# Patient Record
Sex: MALE | Race: OTHER | HISPANIC OR LATINO | URBAN - METROPOLITAN AREA
[De-identification: names, ages, dates, MRNs, and addresses within clinical notes are randomized per-mention and may not be internally consistent; named-entity substitution may affect disease eponyms.]

---

## 2020-07-23 ENCOUNTER — EMERGENCY (EMERGENCY)
Facility: HOSPITAL | Age: 40
LOS: 1 days | Discharge: HOME | End: 2020-07-23
Attending: EMERGENCY MEDICINE
Payer: COMMERCIAL

## 2020-07-23 VITALS
SYSTOLIC BLOOD PRESSURE: 109 MMHG | OXYGEN SATURATION: 98 % | DIASTOLIC BLOOD PRESSURE: 72 MMHG | TEMPERATURE: 98 F | RESPIRATION RATE: 16 BRPM | WEIGHT: 176.37 LBS | HEART RATE: 84 BPM

## 2020-07-23 PROCEDURE — 99283 EMERGENCY DEPT VISIT LOW MDM: CPT

## 2020-07-23 PROCEDURE — 72100 X-RAY EXAM L-S SPINE 2/3 VWS: CPT | Mod: 26

## 2020-07-23 PROCEDURE — 72100 X-RAY EXAM L-S SPINE 2/3 VWS: CPT

## 2020-07-23 RX ORDER — IBUPROFEN 200 MG
1 TABLET ORAL
Qty: 28 | Refills: 0
Start: 2020-07-23 | End: 2020-07-29

## 2020-07-23 RX ORDER — IBUPROFEN 200 MG
600 TABLET ORAL ONCE
Refills: 0 | Status: COMPLETED | OUTPATIENT
Start: 2020-07-23 | End: 2020-07-23

## 2020-07-23 RX ADMIN — Medication 600 MILLIGRAM(S): at 20:45
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
## 2020-07-23 NOTE — ED ADULT NURSE NOTE - NSIMPLEMENTINTERV_GEN_ALL_ED
no
Implemented All Universal Safety Interventions:  [place] to call system. Call bell, personal items and telephone within reach. Instruct patient to call for assistance. Room bathroom lighting operational. Non-slip footwear when patient is off stretcher. Physically safe environment: no spills, clutter or unnecessary equipment. Stretcher in lowest position, wheels locked, appropriate side rails in place.
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
## 2020-07-23 NOTE — ED PROVIDER NOTE - PATIENT PORTAL LINK FT
You can access the FollowMyHealth Patient Portal offered by [facility] by registering at the following website: [email or web address]. By joining [employer]’s FollowMyHealth portal, you will also be able to view your health information using other applications (apps) compatible with our system.

## 2023-12-28 ENCOUNTER — EMERGENCY (EMERGENCY)
Facility: HOSPITAL | Age: 43
LOS: 1 days | Discharge: HOME | End: 2023-12-28
Attending: STUDENT IN AN ORGANIZED HEALTH CARE EDUCATION/TRAINING PROGRAM
Payer: COMMERCIAL

## 2023-12-28 VITALS
SYSTOLIC BLOOD PRESSURE: 113 MMHG | WEIGHT: 175.93 LBS | HEIGHT: 66 IN | OXYGEN SATURATION: 96 % | TEMPERATURE: 98 F | HEART RATE: 77 BPM | RESPIRATION RATE: 18 BRPM | DIASTOLIC BLOOD PRESSURE: 76 MMHG

## 2023-12-28 VITALS
HEART RATE: 66 BPM | DIASTOLIC BLOOD PRESSURE: 66 MMHG | OXYGEN SATURATION: 100 % | TEMPERATURE: 98 F | RESPIRATION RATE: 18 BRPM | SYSTOLIC BLOOD PRESSURE: 105 MMHG

## 2023-12-28 LAB
ALBUMIN SERPL ELPH-MCNC: 3.9 G/DL — SIGNIFICANT CHANGE UP (ref 3.5–5)
ALBUMIN SERPL ELPH-MCNC: 3.9 G/DL — SIGNIFICANT CHANGE UP (ref 3.5–5)
ALP SERPL-CCNC: 48 U/L — SIGNIFICANT CHANGE UP (ref 40–120)
ALP SERPL-CCNC: 48 U/L — SIGNIFICANT CHANGE UP (ref 40–120)
ALT FLD-CCNC: 26 U/L DA — SIGNIFICANT CHANGE UP (ref 10–60)
ALT FLD-CCNC: 26 U/L DA — SIGNIFICANT CHANGE UP (ref 10–60)
ANION GAP SERPL CALC-SCNC: 5 MMOL/L — SIGNIFICANT CHANGE UP (ref 5–17)
ANION GAP SERPL CALC-SCNC: 5 MMOL/L — SIGNIFICANT CHANGE UP (ref 5–17)
AST SERPL-CCNC: 16 U/L — SIGNIFICANT CHANGE UP (ref 10–40)
AST SERPL-CCNC: 16 U/L — SIGNIFICANT CHANGE UP (ref 10–40)
BASOPHILS # BLD AUTO: 0.05 K/UL — SIGNIFICANT CHANGE UP (ref 0–0.2)
BASOPHILS # BLD AUTO: 0.05 K/UL — SIGNIFICANT CHANGE UP (ref 0–0.2)
BASOPHILS NFR BLD AUTO: 0.8 % — SIGNIFICANT CHANGE UP (ref 0–2)
BASOPHILS NFR BLD AUTO: 0.8 % — SIGNIFICANT CHANGE UP (ref 0–2)
BILIRUB SERPL-MCNC: 1.4 MG/DL — HIGH (ref 0.2–1.2)
BILIRUB SERPL-MCNC: 1.4 MG/DL — HIGH (ref 0.2–1.2)
BUN SERPL-MCNC: 12 MG/DL — SIGNIFICANT CHANGE UP (ref 7–18)
BUN SERPL-MCNC: 12 MG/DL — SIGNIFICANT CHANGE UP (ref 7–18)
CALCIUM SERPL-MCNC: 9.2 MG/DL — SIGNIFICANT CHANGE UP (ref 8.4–10.5)
CALCIUM SERPL-MCNC: 9.2 MG/DL — SIGNIFICANT CHANGE UP (ref 8.4–10.5)
CHLORIDE SERPL-SCNC: 104 MMOL/L — SIGNIFICANT CHANGE UP (ref 96–108)
CHLORIDE SERPL-SCNC: 104 MMOL/L — SIGNIFICANT CHANGE UP (ref 96–108)
CO2 SERPL-SCNC: 29 MMOL/L — SIGNIFICANT CHANGE UP (ref 22–31)
CO2 SERPL-SCNC: 29 MMOL/L — SIGNIFICANT CHANGE UP (ref 22–31)
CREAT SERPL-MCNC: 0.81 MG/DL — SIGNIFICANT CHANGE UP (ref 0.5–1.3)
CREAT SERPL-MCNC: 0.81 MG/DL — SIGNIFICANT CHANGE UP (ref 0.5–1.3)
EGFR: 112 ML/MIN/1.73M2 — SIGNIFICANT CHANGE UP
EGFR: 112 ML/MIN/1.73M2 — SIGNIFICANT CHANGE UP
EOSINOPHIL # BLD AUTO: 0.08 K/UL — SIGNIFICANT CHANGE UP (ref 0–0.5)
EOSINOPHIL # BLD AUTO: 0.08 K/UL — SIGNIFICANT CHANGE UP (ref 0–0.5)
EOSINOPHIL NFR BLD AUTO: 1.3 % — SIGNIFICANT CHANGE UP (ref 0–6)
EOSINOPHIL NFR BLD AUTO: 1.3 % — SIGNIFICANT CHANGE UP (ref 0–6)
FLUAV AG NPH QL: SIGNIFICANT CHANGE UP
FLUAV AG NPH QL: SIGNIFICANT CHANGE UP
FLUBV AG NPH QL: SIGNIFICANT CHANGE UP
FLUBV AG NPH QL: SIGNIFICANT CHANGE UP
GLUCOSE SERPL-MCNC: 81 MG/DL — SIGNIFICANT CHANGE UP (ref 70–99)
GLUCOSE SERPL-MCNC: 81 MG/DL — SIGNIFICANT CHANGE UP (ref 70–99)
HCT VFR BLD CALC: 46.1 % — SIGNIFICANT CHANGE UP (ref 39–50)
HCT VFR BLD CALC: 46.1 % — SIGNIFICANT CHANGE UP (ref 39–50)
HGB BLD-MCNC: 14.7 G/DL — SIGNIFICANT CHANGE UP (ref 13–17)
HGB BLD-MCNC: 14.7 G/DL — SIGNIFICANT CHANGE UP (ref 13–17)
IMM GRANULOCYTES NFR BLD AUTO: 0.2 % — SIGNIFICANT CHANGE UP (ref 0–0.9)
IMM GRANULOCYTES NFR BLD AUTO: 0.2 % — SIGNIFICANT CHANGE UP (ref 0–0.9)
LYMPHOCYTES # BLD AUTO: 1.61 K/UL — SIGNIFICANT CHANGE UP (ref 1–3.3)
LYMPHOCYTES # BLD AUTO: 1.61 K/UL — SIGNIFICANT CHANGE UP (ref 1–3.3)
LYMPHOCYTES # BLD AUTO: 25.4 % — SIGNIFICANT CHANGE UP (ref 13–44)
LYMPHOCYTES # BLD AUTO: 25.4 % — SIGNIFICANT CHANGE UP (ref 13–44)
MCHC RBC-ENTMCNC: 27.4 PG — SIGNIFICANT CHANGE UP (ref 27–34)
MCHC RBC-ENTMCNC: 27.4 PG — SIGNIFICANT CHANGE UP (ref 27–34)
MCHC RBC-ENTMCNC: 31.9 GM/DL — LOW (ref 32–36)
MCHC RBC-ENTMCNC: 31.9 GM/DL — LOW (ref 32–36)
MCV RBC AUTO: 86 FL — SIGNIFICANT CHANGE UP (ref 80–100)
MCV RBC AUTO: 86 FL — SIGNIFICANT CHANGE UP (ref 80–100)
MONOCYTES # BLD AUTO: 0.48 K/UL — SIGNIFICANT CHANGE UP (ref 0–0.9)
MONOCYTES # BLD AUTO: 0.48 K/UL — SIGNIFICANT CHANGE UP (ref 0–0.9)
MONOCYTES NFR BLD AUTO: 7.6 % — SIGNIFICANT CHANGE UP (ref 2–14)
MONOCYTES NFR BLD AUTO: 7.6 % — SIGNIFICANT CHANGE UP (ref 2–14)
NEUTROPHILS # BLD AUTO: 4.12 K/UL — SIGNIFICANT CHANGE UP (ref 1.8–7.4)
NEUTROPHILS # BLD AUTO: 4.12 K/UL — SIGNIFICANT CHANGE UP (ref 1.8–7.4)
NEUTROPHILS NFR BLD AUTO: 64.7 % — SIGNIFICANT CHANGE UP (ref 43–77)
NEUTROPHILS NFR BLD AUTO: 64.7 % — SIGNIFICANT CHANGE UP (ref 43–77)
NRBC # BLD: 0 /100 WBCS — SIGNIFICANT CHANGE UP (ref 0–0)
NRBC # BLD: 0 /100 WBCS — SIGNIFICANT CHANGE UP (ref 0–0)
PLATELET # BLD AUTO: 266 K/UL — SIGNIFICANT CHANGE UP (ref 150–400)
PLATELET # BLD AUTO: 266 K/UL — SIGNIFICANT CHANGE UP (ref 150–400)
POTASSIUM SERPL-MCNC: 3.5 MMOL/L — SIGNIFICANT CHANGE UP (ref 3.5–5.3)
POTASSIUM SERPL-MCNC: 3.5 MMOL/L — SIGNIFICANT CHANGE UP (ref 3.5–5.3)
POTASSIUM SERPL-SCNC: 3.5 MMOL/L — SIGNIFICANT CHANGE UP (ref 3.5–5.3)
POTASSIUM SERPL-SCNC: 3.5 MMOL/L — SIGNIFICANT CHANGE UP (ref 3.5–5.3)
PROT SERPL-MCNC: 7.6 G/DL — SIGNIFICANT CHANGE UP (ref 6–8.3)
PROT SERPL-MCNC: 7.6 G/DL — SIGNIFICANT CHANGE UP (ref 6–8.3)
RBC # BLD: 5.36 M/UL — SIGNIFICANT CHANGE UP (ref 4.2–5.8)
RBC # BLD: 5.36 M/UL — SIGNIFICANT CHANGE UP (ref 4.2–5.8)
RBC # FLD: 14 % — SIGNIFICANT CHANGE UP (ref 10.3–14.5)
RBC # FLD: 14 % — SIGNIFICANT CHANGE UP (ref 10.3–14.5)
SARS-COV-2 RNA SPEC QL NAA+PROBE: SIGNIFICANT CHANGE UP
SARS-COV-2 RNA SPEC QL NAA+PROBE: SIGNIFICANT CHANGE UP
SODIUM SERPL-SCNC: 138 MMOL/L — SIGNIFICANT CHANGE UP (ref 135–145)
SODIUM SERPL-SCNC: 138 MMOL/L — SIGNIFICANT CHANGE UP (ref 135–145)
WBC # BLD: 6.35 K/UL — SIGNIFICANT CHANGE UP (ref 3.8–10.5)
WBC # BLD: 6.35 K/UL — SIGNIFICANT CHANGE UP (ref 3.8–10.5)
WBC # FLD AUTO: 6.35 K/UL — SIGNIFICANT CHANGE UP (ref 3.8–10.5)
WBC # FLD AUTO: 6.35 K/UL — SIGNIFICANT CHANGE UP (ref 3.8–10.5)

## 2023-12-28 PROCEDURE — 80053 COMPREHEN METABOLIC PANEL: CPT

## 2023-12-28 PROCEDURE — 96374 THER/PROPH/DIAG INJ IV PUSH: CPT

## 2023-12-28 PROCEDURE — 36415 COLL VENOUS BLD VENIPUNCTURE: CPT

## 2023-12-28 PROCEDURE — 99284 EMERGENCY DEPT VISIT MOD MDM: CPT

## 2023-12-28 PROCEDURE — 99284 EMERGENCY DEPT VISIT MOD MDM: CPT | Mod: 25

## 2023-12-28 PROCEDURE — 96375 TX/PRO/DX INJ NEW DRUG ADDON: CPT

## 2023-12-28 PROCEDURE — 85025 COMPLETE CBC W/AUTO DIFF WBC: CPT

## 2023-12-28 PROCEDURE — 87637 SARSCOV2&INF A&B&RSV AMP PRB: CPT

## 2023-12-28 PROCEDURE — 93005 ELECTROCARDIOGRAM TRACING: CPT

## 2023-12-28 RX ORDER — ACETAMINOPHEN 500 MG
1000 TABLET ORAL ONCE
Refills: 0 | Status: COMPLETED | OUTPATIENT
Start: 2023-12-28 | End: 2023-12-28

## 2023-12-28 RX ORDER — KETOROLAC TROMETHAMINE 30 MG/ML
15 SYRINGE (ML) INJECTION ONCE
Refills: 0 | Status: DISCONTINUED | OUTPATIENT
Start: 2023-12-28 | End: 2023-12-28

## 2023-12-28 RX ORDER — SODIUM CHLORIDE 9 MG/ML
1000 INJECTION INTRAMUSCULAR; INTRAVENOUS; SUBCUTANEOUS ONCE
Refills: 0 | Status: COMPLETED | OUTPATIENT
Start: 2023-12-28 | End: 2023-12-28

## 2023-12-28 RX ADMIN — SODIUM CHLORIDE 1000 MILLILITER(S): 9 INJECTION INTRAMUSCULAR; INTRAVENOUS; SUBCUTANEOUS at 14:44

## 2023-12-28 RX ADMIN — Medication 15 MILLIGRAM(S): at 14:44

## 2023-12-28 RX ADMIN — Medication 400 MILLIGRAM(S): at 14:44

## 2023-12-28 NOTE — ED PROVIDER NOTE - NSFOLLOWUPINSTRUCTIONS_ED_ALL_ED_FT
Le atendieron hoy en urgencias por dolor de alex y diarrea.    Mientras estuvo aquí, realizó laboratorios que se discutieron con usted.  No encontramos ninguna condición quirúrgica o de emergencia que requiera que usted permanezca en el hospital.    Kelsi un seguimiento con powell médico de atención primaria dentro de 1 a 3 días. Llame y hágales saber que lo atendieron en emergencias hoy.  Lleve los resultados de hi análisis de zina e imágenes a powell tim, si corresponde.    Para el dolor, tome acetaminofén 650 mg cada 6 horas.  Además, también puedes sandee ibuprofeno 600 mg cada 6 horas para el dolor.  Mantente hidratado, bouchra mucha agua. Consuma comidas regulares.    Regrese a la mike de emergencias si cualquier síntoma o inquietud empeora, incluido dolor en el pecho, dificultad para respirar, aturdimiento, debilidad o cualquier otra inquietud.    ----    You were seen in the ER today for headache and diarrhea.    While you were here you labs done which were discussed with you.   We did not find any emergent or surgical conditions that require you to stay in the hospital.    Please follow up with your primary care doctor within 1 - 3 days. Call and let them know you were seen in the ER today.   Bring the results of your blood work and imaging with you to your appointment, if applicable.    For pain, please take acetaminophen 650 mg every 6 hours for pain.   Additionally, you can also take ibuprofen 600 mg every 6 hours for pain.  Stay hydrated, drink plenty of water. Eat regular meals.    Return to the ER for any worsening symptoms or concerns, including chest pain, shortness of breath, lightheadedness, weakness, or any other concerns.

## 2023-12-28 NOTE — ED ADULT NURSE NOTE - NS_SISCREENINGSR_GEN_ALL_ED
Negative Home Suture Removal Text: Patient was provided a home suture removal kit and will remove their sutures at home.  If they have any questions or difficulties they will call the office.

## 2023-12-28 NOTE — ED ADULT TRIAGE NOTE - CHIEF COMPLAINT QUOTE
Pt c/o dizziness, headaches, with diarrhea x 2 days. Pt states "under my eyes feel heavy and burning".

## 2023-12-28 NOTE — ED PROVIDER NOTE - PROGRESS NOTE DETAILS
Angela-: pt seen and re-evaluated at bedside.  Pt states their symptoms have improved.  Pt comfortable in NAD.  Discussed lab results using  #[id number], discussed supportive treatment, PMD follow up, and return precautions, pt understood and agreeable with plan. Angela-: pt seen and re-evaluated at bedside.  Pt states their symptoms have improved.  Pt comfortable in NAD.  Discussed lab results using  #[id number], discussed supportive treatment, PMD follow up, and return precautions, pt understood and agreeable with plan.

## 2023-12-28 NOTE — ED PROVIDER NOTE - ATTENDING CONTRIBUTION TO CARE
43 year old male with no pertinent PMH presents with diarrhea x 2 days. Patient reports subjective fevers, chills, ~3 episodes of loose stools daily associated with nausea. Denies any documented fevers, chest pain, shortness of breath, bloody stools, black tarry stools, or rash. Denies any recent hospitalization, recent abx use, or recent travel. Denies any additional complaints. Abdomen nontender. Neurologic exam is nonfocal, not c/w CVA or primary neurologic abnormality. Will obtain labs eval for infectious/metabolic abnormality, provide supportive treatment with dispo pending workup.

## 2023-12-28 NOTE — ED PROVIDER NOTE - PATIENT PORTAL LINK FT
You can access the FollowMyHealth Patient Portal offered by [facility] by registering at the following website: [email or web address]. By joining [employer]’s FollowMyHealth portal, you will also be able to view your health information using other applications (apps) compatible with our system. You can access the FollowMyHealth Patient Portal offered by [facility] by registering at the following website: [email or web address]. By joining [employer]’s FollowMyHealth portal, you will also be able to view your health information using other applications (apps) compatible with our system.

## 2023-12-28 NOTE — ED ADULT NURSE NOTE - NSFALLHARMRISKINTERV_ED_ALL_ED
Communicate risk of Fall with Harm to all staff, patient, and family/Provide visual cue: red socks, yellow wristband, yellow gown, etc/Reinforce activity limits and safety measures with patient and family/Bed in lowest position, wheels locked, appropriate side rails in place/Call bell, personal items and telephone in reach/Instruct patient to call for assistance before getting out of bed/chair/stretcher/Non-slip footwear applied when patient is off stretcher/[place] to call system/Physically safe environment - no spills, clutter or unnecessary equipment/Purposeful Proactive Rounding/Room/bathroom lighting operational, light cord in reach Communicate risk of Fall with Harm to all staff, patient, and family/Provide visual cue: red socks, yellow wristband, yellow gown, etc/Reinforce activity limits and safety measures with patient and family/Bed in lowest position, wheels locked, appropriate side rails in place/Call bell, personal items and telephone in reach/Instruct patient to call for assistance before getting out of bed/chair/stretcher/Non-slip footwear applied when patient is off stretcher/[place] to call system/Physically safe environment - no spills, clutter or unnecessary equipment/Purposeful Proactive Rounding/Room/bathroom lighting operational, light cord in reach

## 2023-12-28 NOTE — ED ADULT NURSE NOTE - OBJECTIVE STATEMENT
pt reports that he started having diarrhea, abdominal pain, dizziness and weakness for the past 2 days. Pt reports only today did he have a bm with a small amount of blood. Pt denies nausea and vomiting and fever. pt reports that he started having diarrhea, abdominal pain, dizziness and weakness for the past 2 days. Pt reports only today did he have a bm with a small amount of blood. Pt denies nausea and vomiting and fever.  ID # [id number]  Name: Matias pt reports that he started having diarrhea, abdominal pain, dizziness and weakness for the past 2 days. Pt reports only today did he have a bm with a small amount of blood. Pt denies nausea and vomiting and fever.  ID # [id number]  Name: Matias

## 2024-03-07 NOTE — ED PROVIDER NOTE - CLINICAL SUMMARY MEDICAL DECISION MAKING FREE TEXT BOX
Martha Parks DO PGY-3  HPI:   43-year-old male with no past medical history not on any medications presents to the ED with 2 days of subjective fever associated with 3 episodes of loose stools daily nonbloody, associated nausea but no vomiting.  Also has gradual onset frontal headache with bilateral eye pressure no vision changes, and lightheadedness with standing up.  Patient reports that he has not been eating and drinking well since symptoms started.  Denies recent travel, hospitalizations, antibiotic use, cough, congestion, fevers or chills.  Has some diffuse cramping abdominal pain that is intermittent.    ROS:   Denies fever, chills, chest pain, shortness of breath, vomiting, dysuria, hematuria    PHYSICAL EXAM:  CONSTITUTIONAL: Well appearing, awake, alert, oriented to person, place, time/situation and in no apparent distress.  HEAD: Atraumatic, no step offs.  NECK: Supple, no meningismus.   EYES: Clear bilaterally, pupils equal, round and reactive to light.  ENMT: Airway patent, Nasal mucosa clear. Mouth with normal mucosa. Uvula is midline.   CARDIAC: Normal rate, regular rhythm. +S1/S2. No murmurs, rubs or gallops.  RESPIRATORY: Breathing unlabored. Breath sounds clear and equal bilaterally.  ABDOMEN:  Soft, nontender, nondistended. No rebound tenderness or guarding.  FLANK: No CVA tenderness B/L.  NEUROLOGICAL: Alert and oriented, no focal deficits, no motor or sensory deficits. CN2-12 intact. Sensation intact x4 extremities. Strength equal of upper and lower extremities B/L.   MSK: No clubbing, cyanosis, or edema. Full range of motion of all extremities. No tenderness to palpation. No midline or paraspinal tenderness. No spinal step-offs.  SKIN: Skin warm and dry. No evidence of rashes or lesions.    MDM:   43-year-old male with no past medical history presents to the ED with 2 days of diarrhea, lightheadedness, nausea and headache.  Arrived hemodynamically stable, nontoxic-appearing, no focal neurodeficits on exam and no abdominal tenderness to palpation without signs of peritoneal signs.  Differential includes but is not limited to viral syndrome, hematologic or electrolyte abnormalities, dehydration, tension headache versus migraine.  Plan to obtain labs, swab, give IV fluid hydration, treat headache and reassess.. Unknown if ever smoked

## 2024-06-21 NOTE — ED PROVIDER NOTE - TOBACCO USE
Genitourinary:  Negative for difficulty urinating, dysuria, flank pain, frequency, hematuria, menstrual problem, vaginal bleeding, vaginal discharge and vaginal pain.   Musculoskeletal:  Positive for arthralgias (right knee and hands at times). Negative for back pain, joint swelling, neck pain and neck stiffness.   Skin:  Negative for pallor, rash and wound.   Allergic/Immunologic: Positive for environmental allergies. Negative for food allergies.   Neurological:  Positive for headaches (occasional migraine). Negative for dizziness, tremors, seizures, weakness and numbness.   Hematological:  Negative for adenopathy. Bruises/bleeds easily.   Psychiatric/Behavioral:  Negative for behavioral problems, decreased concentration, dysphoric mood and sleep disturbance. The patient is not nervous/anxious.           Objective   Physical Exam  Vitals and nursing note reviewed. Exam conducted with a chaperone present.   Constitutional:       Appearance: Normal appearance. She is normal weight.   HENT:      Head: Normocephalic.      Right Ear: Tympanic membrane, ear canal and external ear normal.      Left Ear: Tympanic membrane, ear canal and external ear normal.      Nose: Nose normal.      Mouth/Throat:      Mouth: Mucous membranes are moist.   Eyes:      Extraocular Movements: Extraocular movements intact.      Conjunctiva/sclera: Conjunctivae normal.      Pupils: Pupils are equal, round, and reactive to light.   Cardiovascular:      Rate and Rhythm: Normal rate and regular rhythm.      Pulses: Normal pulses.      Heart sounds: Normal heart sounds.   Pulmonary:      Effort: Pulmonary effort is normal.      Breath sounds: Normal breath sounds.   Chest:   Breasts:     Right: Normal.      Left: Normal.   Abdominal:      General: Abdomen is flat. Bowel sounds are normal.   Genitourinary:     Exam position: Supine.          Comments: Area of firmness in left labia No fluctuance at present Tender to palpation. No 
Never smoker

## 2025-01-24 ENCOUNTER — EMERGENCY (EMERGENCY)
Facility: HOSPITAL | Age: 45
LOS: 1 days | Discharge: HOME | End: 2025-01-24
Attending: EMERGENCY MEDICINE
Payer: COMMERCIAL

## 2025-01-24 VITALS
OXYGEN SATURATION: 100 % | WEIGHT: 175.05 LBS | HEART RATE: 87 BPM | DIASTOLIC BLOOD PRESSURE: 73 MMHG | HEIGHT: 67 IN | RESPIRATION RATE: 18 BRPM | TEMPERATURE: 99 F | SYSTOLIC BLOOD PRESSURE: 113 MMHG

## 2025-01-24 PROBLEM — Z78.9 OTHER SPECIFIED HEALTH STATUS: Chronic | Status: ACTIVE | Noted: 2023-12-28

## 2025-01-24 LAB
FLUAV AG NPH QL: SIGNIFICANT CHANGE UP
FLUBV AG NPH QL: SIGNIFICANT CHANGE UP
RSV RNA NPH QL NAA+NON-PROBE: SIGNIFICANT CHANGE UP
SARS-COV-2 RNA SPEC QL NAA+PROBE: DETECTED

## 2025-01-24 PROCEDURE — 99283 EMERGENCY DEPT VISIT LOW MDM: CPT | Mod: 25

## 2025-01-24 PROCEDURE — 99284 EMERGENCY DEPT VISIT MOD MDM: CPT

## 2025-01-24 PROCEDURE — 71046 X-RAY EXAM CHEST 2 VIEWS: CPT

## 2025-01-24 PROCEDURE — 87637 SARSCOV2&INF A&B&RSV AMP PRB: CPT

## 2025-01-24 PROCEDURE — 71046 X-RAY EXAM CHEST 2 VIEWS: CPT | Mod: 26

## 2025-01-24 RX ORDER — IBUPROFEN 200 MG
400 TABLET ORAL ONCE
Refills: 0 | Status: COMPLETED | OUTPATIENT
Start: 2025-01-24 | End: 2025-01-24

## 2025-01-24 RX ORDER — CYCLOBENZAPRINE HCL 10 MG
5 TABLET ORAL ONCE
Refills: 0 | Status: COMPLETED | OUTPATIENT
Start: 2025-01-24 | End: 2025-01-24

## 2025-01-24 RX ADMIN — Medication 400 MILLIGRAM(S): at 12:16

## 2025-01-24 RX ADMIN — Medication 5 MILLIGRAM(S): at 12:16

## 2025-01-24 RX ADMIN — Medication 400 MILLIGRAM(S): at 12:46

## 2025-01-24 NOTE — ED PROVIDER NOTE - OBJECTIVE STATEMENT
44 year-old man with no PMHx presenting with L sided back pain since yesterday morning. Reports the pain is 8/10 severity and radiates around his side towards his LUQ. The pain is exacerbated by movement and deep breath. He has not taken any medication for his symptoms. Also endorses associated headache, sore throat, nasal congestion, and dry cough. Denies history of trauma/falls, fevers/chill, chest pain, SOB, leg pain or swelling.     PMHx/PSHx: none  Meds: none  NKDA  Social: no nicotine use

## 2025-01-24 NOTE — ED PROVIDER NOTE - NSFOLLOWUPINSTRUCTIONS_ED_ALL_ED_FT
COVID-19 es pippa infección causada por un virus llamado SARS-CoV-2.     ¿Cuáles son las causas?  El COVID-19 es causado por un virus. Yessi virus puede estar en el aire en forma de gotitas o en superficies. Puede transmitirse de pippa persona infectada cuando tose, estornuda, habla, canta o respira.     Usted corre el riesgo de contraer COVID-19 si ha estado cerca de alguien con la infección. Es más probable que usted se enferme gravemente si:  Tienes 65 años o más.  Tiene ciertas condiciones médicas, lennie:  Cardiopatía.  Diabetes.  Enfermedad respiratoria crónica.  Cáncer.  Embarazo.  Estás inmunocomprometido. Industry significa que powell cuerpo no puede combatir las infecciones fácilmente.  Tienes pippa discapacidad o problemas para moverte, lo que significa que estás inmóvil.    ¿Cuáles son los signos o síntomas?  Las personas pueden tener síntomas diferentes a los del COVID-19. Los síntomas también pueden ser de leves a graves. A menudo aparecen entre 5 y 6 días después de la infección. Genoveva pueden tardar hasta 14 días en aparecer. Los síntomas comunes son:  Tos.  Sentirse cansado.  Nueva pérdida del gusto o del olfato.  Fiebre.  Los síntomas menos comunes son:  Dolor de garganta.  Dolor de alex.  Tanya corporales o musculares.  Diarrea.  Pippa erupción cutánea o dedos de mackenzie o pies de colores extraños.  Ojos rojos o irritados.  A veces, el COVID-19 no causa síntomas.    ¿Cómo se diagnostica esto?  El COVID-19 se puede diagnosticar con pruebas realizadas en el laboratorio o en casa. Se utilizará líquido de la nariz, la boca o los pulmones para detectar el virus.    ¿Cómo se trata esto?  El tratamiento para el COVID-19 depende de qué tan enfermo esté.  Los síntomas leves se pueden tratar en casa con reposo, líquidos y medicamentos de venta kolton.  Los síntomas graves pueden tratarse en pippa unidad de cuidados intensivos (UCI) de un hospital.  Si tiene síntomas y corre riesgo de enfermarse gravemente, es posible que le administren un medicamento que combate los virus. Yessi medicamento se llama antiviral.    ¿Cómo se previene esto?  Para protegerse del COVID-19:  Conozca elsa factores de riesgo.  Vacúnate.  Si powell cuerpo no puede combatir las infecciones fácilmente, hable con powell proveedor sobre el tratamiento para ayudar a prevenir el COVID-19.  Manténgase al menos a 1 metro de distancia de los demás.  Use pippa mascarilla kuldeep ajustada cuando:  No puedes mantenerte alejado de la gente.  Estás en un lugar con poca circulación de aire.  Trate de estar en espacios abiertos con buena circulación de aire cuando esté en público.  Lávese las mackenzie con frecuencia o use un desinfectante para mackenzie a base de alcohol.  Cúbrete la nariz y la boca al toser y estornudar.  Si dion que tiene COVID-19 o ha estado cerca de alguien que lo tiene, quédese en casa y esté solo charissa 5 a 10 días.    Dónde encontrar más información  Centros para el Control y la Prevención de Enfermedades (CDC): cdc.gov  [employer] la [place] (OMS): who.int    Obtenga ayuda de inmediato si:  Tiene dificultad para respirar o le falta el aire.  Tiene dolor o presión en el pecho.  No puedes hablar ni  ninguna parte de tu cuerpo.  Estás confundido.  Elsa síntomas empeoran.  Estos síntomas pueden ser pippa emergencia. Obtenga ayuda de inmediato. Llame al 911.  No espere a crispin si los síntomas desaparecen.  No conduzca usted mismo hasta el hospital.  Esta información no pretende reemplazar los consejos que le brinde powell proveedor de atención médica. Asegúrese de discutir cualquier pregunta que tenga con powell proveedor de atención médica.

## 2025-01-24 NOTE — ED PROVIDER NOTE - PATIENT PORTAL LINK FT
You can access the FollowMyHealth Patient Portal offered by [facility] by registering at the following website: [email or web address]. By joining [employer]’s FollowMyHealth portal, you will also be able to view your health information using other applications (apps) compatible with our system.

## 2025-01-24 NOTE — ED PROVIDER NOTE - ATTENDING CONTRIBUTION TO CARE
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  No smoking  Physical exam chest is clear heart regular rhythm abdomen is soft  Plan will get a chest x-ray will get a Motrin and Flexeril by PERC criteria patient rule out for pulmonary embolism    Agree with medical student's assessment history and physical and disposition

## 2025-01-24 NOTE — ED PROVIDER NOTE - CLINICAL SUMMARY MEDICAL DECISION MAKING FREE TEXT BOX
Healthy 43yo man presenting with back pain and URI sx, likely MSK etiology. Pneumonia, stone not consistent with physical exam. Low risk for PE by PERC criteria. Flexeril and motrin for pain. Will order flu/covid swab and CXR to evaluate for consolidation and fracture.

## 2025-01-24 NOTE — ED PROVIDER NOTE - PHYSICAL EXAMINATION
GENERAL: NAD, sitting comfortably  HEAD:  Atraumatic, normocephalic  EYES: Conjunctiva and sclera clear  NECK: Supple, trachea midline, no JVD  HEART: Regular rate and rhythm, no murmurs, rubs, or gallops  LUNGS: Visibly pained with deep inhalation; clear to auscultation bilaterally, no crackles, wheezing, or rhonchi  ABDOMEN: Soft, nontender, nondistended, +BS  EXTREMITIES: 2+ peripheral pulses bilaterally. No edema. No LE tenderness  NERVOUS SYSTEM:  A&Ox3, moving all extremities, no focal deficits   MSK: Pain L sided back pain provoked by leftward rotation of torso, not exacerbated by arm movements   SKIN: No rashes or lesions

## 2025-01-24 NOTE — ED ADULT TRIAGE NOTE - CHIEF COMPLAINT QUOTE
Left middle back pain and headache since yesterday. No neuro deficit noted. Denies fall. CP, SOB. (4) excellent

## 2025-01-24 NOTE — ED PROVIDER NOTE - CHIEF COMPLAINT
The patient is a 44y Male complaining of back pain general.
The patient is a 44y Male complaining of back pain general.

## 2025-04-13 ENCOUNTER — EMERGENCY (EMERGENCY)
Facility: HOSPITAL | Age: 45
LOS: 1 days | End: 2025-04-13
Attending: EMERGENCY MEDICINE
Payer: COMMERCIAL

## 2025-04-13 VITALS
WEIGHT: 184.97 LBS | DIASTOLIC BLOOD PRESSURE: 67 MMHG | SYSTOLIC BLOOD PRESSURE: 113 MMHG | RESPIRATION RATE: 18 BRPM | OXYGEN SATURATION: 100 % | HEART RATE: 70 BPM | HEIGHT: 67 IN | TEMPERATURE: 98 F

## 2025-04-13 PROCEDURE — 99284 EMERGENCY DEPT VISIT MOD MDM: CPT

## 2025-04-13 PROCEDURE — 73080 X-RAY EXAM OF ELBOW: CPT | Mod: 26,RT

## 2025-04-13 PROCEDURE — 99283 EMERGENCY DEPT VISIT LOW MDM: CPT | Mod: 25

## 2025-04-13 PROCEDURE — 73080 X-RAY EXAM OF ELBOW: CPT

## 2025-04-13 RX ORDER — ACETAMINOPHEN 500 MG/5ML
650 LIQUID (ML) ORAL ONCE
Refills: 0 | Status: COMPLETED | OUTPATIENT
Start: 2025-04-13 | End: 2025-04-13

## 2025-04-13 RX ORDER — IBUPROFEN 200 MG
1 TABLET ORAL
Qty: 21 | Refills: 0
Start: 2025-04-13 | End: 2025-04-19

## 2025-04-13 RX ORDER — CYCLOBENZAPRINE HYDROCHLORIDE 15 MG/1
1 CAPSULE, EXTENDED RELEASE ORAL
Qty: 21 | Refills: 0
Start: 2025-04-13 | End: 2025-04-19

## 2025-04-13 RX ORDER — DEXAMETHASONE 0.5 MG/1
8 TABLET ORAL ONCE
Refills: 0 | Status: COMPLETED | OUTPATIENT
Start: 2025-04-13 | End: 2025-04-13

## 2025-04-13 RX ORDER — IBUPROFEN 200 MG
600 TABLET ORAL ONCE
Refills: 0 | Status: COMPLETED | OUTPATIENT
Start: 2025-04-13 | End: 2025-04-13

## 2025-04-13 RX ADMIN — DEXAMETHASONE 8 MILLIGRAM(S): 0.5 TABLET ORAL at 09:38

## 2025-04-13 RX ADMIN — Medication 600 MILLIGRAM(S): at 09:37

## 2025-04-13 RX ADMIN — Medication 650 MILLIGRAM(S): at 10:07

## 2025-04-13 RX ADMIN — Medication 600 MILLIGRAM(S): at 10:07

## 2025-04-13 RX ADMIN — Medication 650 MILLIGRAM(S): at 09:37

## 2025-04-13 NOTE — ED PROVIDER NOTE - PHYSICAL EXAMINATION
Entirety of right upper extremity is nontender.  Full range of motion of all joints of the right upper extremity without pain.  Radial pulses strong and palpable.  Gross sensation is intact.  No skin changes.

## 2025-04-13 NOTE — ED ADULT TRIAGE NOTE - IDEAL BODY WEIGHT(KG)
66 Advancement Flap (Double) Text: The defect edges were debeveled with a #15 scalpel blade.  Given the location of the defect and the proximity to free margins a double advancement flap was deemed most appropriate.  Using a sterile surgical marker, the appropriate advancement flaps were drawn incorporating the defect and placing the expected incisions within the relaxed skin tension lines where possible.    The area thus outlined was incised deep to adipose tissue with a #15 scalpel blade.  The skin margins were undermined to an appropriate distance in all directions utilizing iris scissors.

## 2025-04-13 NOTE — ED ADULT NURSE NOTE - IS THE PATIENT ABLE TO BE SCREENED?
Pre-Operative:  1. Patient/Caregiver identifies - states name and date of birth. 2.  The patient is free from signs and symptoms of injury. 3.  The patient receives appropriate medication(s), safely administered during the Perioperative period. 4.  The patient is free from signs and symptoms of infection. 5.  The patient has wound / tissue perfusion. 6.  The patients's fluid, electrolyte, and acid-base balances are established preoperatively. 7.  The patient's pulmonary function is established preoperatively. 8.  The patient's cardiovascular status is established preoperatively. 9.  The patient / caregiver demonstrates knowledge of nutritional management related to the operative or other invasive procedure. 10. The patient/caregiver demonstrates knowledge of medication management. 11. The patient/caregiver demonstrates knowledge of pain management. 12.  The patient participates in the rehabilitation process as applicable. 13.  The patient/caregiver participates in decisions affection his or her Perioperative plan of care. 14.  The patient's care is consistent with the individualized Perioperative plan of care. 15.  The patient's right to privacy is maintained. 16.  The patient is the recipient of competent and ethical care within legal standards of practice. 17.  The patient's value system, lifestyle, ethnicity, and culture are considered, respected, and incorporated in the Perioperative plan of care and understands special services available. 18.  The patient demonstrates and/or reports adequate pain control throughout the the Perioperative period. 19. The patient's neurological status is established preoperatively. 20. The patient/caregiver demonstrates knowledge of the expected responses to the operative or invasive procedure. 21.  Patient/Caregiver has reduced anxiety. Interventions- Familiarize with environment and equipment.   22. Patient/Caregiver verbalizes understanding of Phase I
Yes

## 2025-04-13 NOTE — ED ADULT NURSE NOTE - ISOLATION TYPE:
"Large Joint Aspiration/Injection: bilateral knee  Performed by: Cj Caruso MD  Authorized by: Cj Caruso MD  Date/Time: 2/17/2020 10:00 AM    Consent Done?:  Yes (Verbal)  Indications:  pain  Timeout: Immediately prior to procedure a time out was called to verify the correct patient, procedure, equipment, support staff and site/side marked as required.  Prep:Patient was prepped and draped in the usual sterile fashion.  Procedure site marked: Yes     Anesthesia  Local anesthesia not used        Details:   Needle size: 20 G  Ultrasonic Guidance for needle placement: Yes  Images are saved and documented.   Approach: lateral  Location:  Knee  Site:  Bilateral knee    Medications (Right): 20 mg sodium hyaluronate (EUFLEXXA) 10 mg/mL(mw 2.4 -3.6 million)  Medications (Left): 20 mg sodium hyaluronate (EUFLEXXA) 10 mg/mL(mw 2.4 -3.6 million)  Patient tolerance:  patient tolerated the procedure well with no immediate complications    Description of ultrasound utilization for needle guidance:   Ultrasound guidance used for needle localization. Images saved and stored for documentation. The knee joint was visualized. Dynamic visualization of the 20g x 3.5" needle was continuous throughout the procedure.         " None

## 2025-04-13 NOTE — ED PROVIDER NOTE - PROGRESS NOTE DETAILS
Pain is improved with medications.  X-ray is unremarkable.  Will discharge.  Likely tendinitis.  Follow-up with primary care doctor.  Return precautions discussed.

## 2025-04-13 NOTE — ED PROVIDER NOTE - CLINICAL SUMMARY MEDICAL DECISION MAKING FREE TEXT BOX
44-year-old male with right upper extremity pain.  PE as above.    X-ray right elbow, pain control, reassess.

## 2025-04-13 NOTE — ED PROVIDER NOTE - OBJECTIVE STATEMENT
Office Visit    Assessment AND Plan    Myalgia involving the proximal sides of the  lower extremities and intermittent stiffness of her hands.  Rheumatoid factor was negative, ESR CRP was slightly high, however she is a diabetic which can affect those 2 levels.  DC pravastatin , start Crestor at 20 mg daily in 2 weeks, call before starting Crestor to update on the myalgia symptoms.    CHIEF COMPLAINT    Follow-up (2 week -labs)        History of present illness    She is here today for follow-up on myalgia for which she underwent blood work .      I have reviewed the past medical, family and social history sections including the medications and allergies listed in the above medical record as well as the nursing notes.       Physical Exam    Vital Signs:  Blood pressure 128/82, pulse 76, resp. rate 16, weight 98 kg.  Extremities:  No edema.  Palpable pedal pulses bilaterally.  Feet exam normal monofilament test      The patient indicates understanding of these issues and agrees with the plan.        44-year-old male denies past medical history presents with a 2-week history of right arm pain.  Patient states it starts by his right elbow and moves up his arm.  Specifically states with certain movements or when he tries to put pressure on his arm while it is flexed at his elbow.  States never had symptoms like this before.  Denies other complaints at this time.

## 2025-04-13 NOTE — ED PROVIDER NOTE - NSFOLLOWUPINSTRUCTIONS_ED_ALL_ED_FT
Tendinitis  Tendinitis  Anatomy of the shoulder showing an inflamed tendon.  La tendinitis es la inflamación de un tendón. Un tendón es un cordón shaw de tejido que conecta el músculo con el hueso.    La tendinitis puede afectar cualquier tendón, kamila afecta con más frecuencia los siguientes:  Tendón del hombro (tendón del bíceps o del manguito de los rotadores).  Tendón del tobillo (tendón de Scotty).  Tendones del codo.  Los tendones de la jennifer.  ¿Cuáles son las causas?  Esta afección puede ser causada por lo siguiente:  Uso excesivo de un tendón o músculo. Esta es la causa más frecuente.  Desgaste relacionado con la edad.  Lesiones.  Afecciones inflamatorias, lennie la artritis.  Ciertos medicamentos.  ¿Qué incrementa el riesgo?  Tiene más probabilidades de tener esta afección si realiza actividades que implican los mismos movimientos pippa y otra vez (repetitivos).    ¿Cuáles son los signos o los síntomas?  Los síntomas de esta afección pueden incluir los siguientes:  Dolor.  Sensibilidad.  Inflamación leve.  Disminución de la amplitud de movimientos.  ¿Cómo se diagnostica?  Esta afección se diagnostica mediante un examen físico. También pueden hacerle estudios, por ejemplo:  Pippa ecografía. Esta utiliza ondas de martin para sandee pippa imagen del interior del cuerpo en la laurent afectada.  Resonancia magnética (RM). Esta usa layne magnéticos y ondas sonoras para sandee pippa imagen del interior del cuerpo en la laurent afectada.  ¿Cómo se trata?  Esta afección puede tratarse con reposo, hielo, presión (compresión) y levantamiento (elevación) de la laurent afectada por encima del nivel del corazón. [place] se conoce lennie terapia RHCE. El tratamiento también puede incluir lo siguiente:  Medicamentos para ayudar a reducir la inflamación o el dolor.  Ejercicios o fisioterapia para mejorar el movimiento y la fuerza del tendón.  Un dispositivo ortopédico o pippa férula.  Inyección de corticoesteroides. Se puede usar en algunos casos.  Pippa cirugía. [place] amy vez es necesario y solo se usa si todos los demás tratamientos vargas fracasado.  Siga estas instrucciones en powell casa:  Si tiene pippa férula o un dispositivo ortopédico extraíble:    Use la férula o el dispositivo ortopédico lennie se lo haya indicado el médico. Quíteselos solamente lennie se lo haya indicado el médico.  Controle todos los zaynab la piel alrededor de la férula o el dispositivo ortopédico. Informe al médico acerca de cualquier inquietud.  Afloje la férula o el dispositivo ortopédico si los dedos de las mackenzie o de los pies se le entumecen, siente hormigueos o se le enfrían y se tornan de color elise.  Mantenga la férula o el dispositivo ortopédico limpios.  Si la férula o el dispositivo ortopédico no son impermeables:  No deje que se mojen.  Cúbralo con un envoltorio impermeable cuando tome un baño de inmersión o pippa ducha, o quíteselo lennie se lo haya indicado el médico.  Control del dolor, la rigidez y la hinchazón    Bag of ice on a towel on the skin.  A heating pad being used on the affected area.  Si se lo indican, aplique hielo sobre la laurent afectada. Para hacer esto:  Si tiene pippa férula o un dispositivo ortopédico desmontables, quíteselos lennie se lo haya indicado el médico.  Ponga el hielo en pippa bolsa plástica.  Coloque pippa toalla entre la piel y la bolsa.  Aplique el hielo charissa 20 minutos, 2 a 3 veces por día.  Retire el hielo si la piel se pone de color shi brillante. [place] es muy importante. Si no puede sentir dolor, calor o frío, tiene un mayor riesgo de que se dañe la laurent.  Mueva los dedos de las mackenzie o de los pies (de los miembros afectados) con frecuencia, si esto corresponde. [place] puede ayudar a reducir el entumecimiento y la hinchazón.  Si se lo indican, eleve la laurent afectada por encima del nivel del corazón cuando esté sentado o acostado.  Si se lo indican, aplique calor en la laurent afectada antes de realizar ejercicios. Use la jaycee de calor que el médico le recomiende, lennie pippa compresa de calor húmedo o pippa almohadilla térmica.  Coloque pippa toalla entre la piel y la jaycee de calor.  Aplique calor charissa 20 a 30 minutos.  Retire la jaycee de calor si la piel se pone de color shi brillante. [place] es especialmente importante si no puede sentir dolor, calor o frío. Corre un mayor riesgo de sufrir quemaduras.  Actividad    Kelsi reposo para descansar la laurent afectada, lennie se lo haya indicado el médico.  Pregúntele al médico cuándo puede volver a conducir vehículos, si tiene pippa férula o un dispositivo ortopédico en el brazo o la pierna.  Retome elsa actividades normales según lo indicado por el médico. Pregúntele al médico qué actividades son seguras para usted.  Evite usar la laurent afectada mientras experimenta síntomas de tendinitis.  Kelsi ejercicio lennie se lo haya indicado el médico.  Instrucciones generales    Use pippa venda elástica o de compresión únicamente lennie se lo haya indicado el médico.  Use los medicamentos de venta kolton y los recetados solamente lennie te lo haya indicado el médico.  Concurra a todas las visitas de seguimiento. [place] es importante.  Comuníquese con un médico si:  Elsa síntomas no mejoran.  Surgen problemas nuevos que son inexplicables, lennie entumecimiento en las mackenzie o los pies.  Resumen  La tendinitis es la inflamación de un tendón.  Tiene más probabilidades de tenga esta afección si realiza actividades que implican los mismos movimientos pippa y otra vez.  Esta afección puede tratarse con reposo, hielo, presión (compresión) y elevación de la laurent afectada por encima del nivel del corazón. [place] se conoce lennie terapia RHCE.  Evite usar la laurent afectada mientras experimenta síntomas de tendinitis.  Esta información no tiene lennie fin reemplazar el consejo del médico. Asegúrese de hacerle al médico cualquier pregunta que tenga.

## 2025-04-13 NOTE — ED PROVIDER NOTE - PATIENT PORTAL LINK FT
You can access the FollowMyHealth Patient Portal offered by [facility] by registering at the following website: [email or web address]. By joining [employer]’s FollowMyHealth portal, you will also be able to view your health information using other applications (apps) compatible with our system.